# Patient Record
Sex: FEMALE | URBAN - METROPOLITAN AREA
[De-identification: names, ages, dates, MRNs, and addresses within clinical notes are randomized per-mention and may not be internally consistent; named-entity substitution may affect disease eponyms.]

---

## 2021-08-21 ENCOUNTER — OFFICE VISIT (OUTPATIENT)
Dept: URBAN - METROPOLITAN AREA CLINIC 56 | Facility: CLINIC | Age: 46
End: 2021-08-21
Payer: COMMERCIAL

## 2021-08-21 DIAGNOSIS — E11.9 DIABETES MELLITUS TYPE 2 WITHOUT MENTION OF COMPLICATION: Primary | ICD-10-CM

## 2021-08-21 DIAGNOSIS — H43.393 OTHER VITREOUS OPACITIES, BILATERAL: ICD-10-CM

## 2021-08-21 DIAGNOSIS — H02.822 CYSTS OF RIGHT LOWER EYELID: ICD-10-CM

## 2021-08-21 DIAGNOSIS — H02.825 CYSTS OF LEFT LOWER EYELID: ICD-10-CM

## 2021-08-21 DIAGNOSIS — H25.13 AGE-RELATED NUCLEAR CATARACT, BILATERAL: ICD-10-CM

## 2021-08-21 DIAGNOSIS — H02.821 CYSTS OF RIGHT UPPER EYELID: ICD-10-CM

## 2021-08-21 DIAGNOSIS — H02.824 CYSTS OF LEFT UPPER EYELID: ICD-10-CM

## 2021-08-21 DIAGNOSIS — H53.19 OTHER SUBJECTIVE VISUAL DISTURBANCES: ICD-10-CM

## 2021-08-21 DIAGNOSIS — Z79.84 LONG TERM USE OF ORAL ANTIDIABETIC DRUGS: ICD-10-CM

## 2021-08-21 PROCEDURE — 92250 FUNDUS PHOTOGRAPHY W/I&R: CPT | Performed by: OPTOMETRIST

## 2021-08-21 PROCEDURE — 92134 CPTRZ OPH DX IMG PST SGM RTA: CPT | Performed by: OPTOMETRIST

## 2021-08-21 PROCEDURE — 92004 COMPRE OPH EXAM NEW PT 1/>: CPT | Performed by: OPTOMETRIST

## 2021-08-21 ASSESSMENT — KERATOMETRY
OD: 44.49
OS: 44.35

## 2021-08-21 ASSESSMENT — INTRAOCULAR PRESSURE
OS: 20
OD: 20

## 2021-08-21 ASSESSMENT — VISUAL ACUITY
OD: 20/20
OS: 20/20

## 2021-08-21 NOTE — IMPRESSION/PLAN
Impression: Cysts of left lower eyelid: H02.825. Plan: Asymptomatic at this time. Can see oculoplastics in future if becomes bothersome.

## 2021-08-21 NOTE — IMPRESSION/PLAN
Impression: Cysts of right upper eyelid: H02.821. Plan: Asymptomatic at this time. Can see oculoplastics in future if becomes bothersome.

## 2021-08-21 NOTE — IMPRESSION/PLAN
Impression: Cysts of left upper eyelid: H02.824. Plan: Asymptomatic at this time. Can see oculoplastics in future if becomes bothersome.

## 2021-08-21 NOTE — IMPRESSION/PLAN
Impression: Other subjective visual disturbances: H53.19. Plan: Patient education fluctuation in vision is secondary to high A1C. Use OTC glasses for now. Refraction when A1C is lowered to 7 or below.

## 2021-08-21 NOTE — IMPRESSION/PLAN
Impression: Other vitreous opacities, bilateral: H43.393. Plan: Stable and asymptomatic. S/S of RD discussed with patient.

## 2025-01-30 ENCOUNTER — APPOINTMENT (OUTPATIENT)
Dept: URBAN - METROPOLITAN AREA CLINIC 174 | Facility: CLINIC | Age: 50
Setting detail: DERMATOLOGY
End: 2025-01-30

## 2025-01-30 DIAGNOSIS — D22 MELANOCYTIC NEVI: ICD-10-CM

## 2025-01-30 DIAGNOSIS — D485 NEOPLASM OF UNCERTAIN BEHAVIOR OF SKIN: ICD-10-CM

## 2025-01-30 PROBLEM — D22.4 MELANOCYTIC NEVI OF SCALP AND NECK: Status: ACTIVE | Noted: 2025-01-30

## 2025-01-30 PROBLEM — D48.5 NEOPLASM OF UNCERTAIN BEHAVIOR OF SKIN: Status: ACTIVE | Noted: 2025-01-30

## 2025-01-30 PROCEDURE — ? COUNSELING

## 2025-01-30 PROCEDURE — ? BIOPSY BY SHAVE METHOD

## 2025-01-30 PROCEDURE — 99202 OFFICE O/P NEW SF 15 MIN: CPT | Mod: 25

## 2025-01-30 PROCEDURE — 11102 TANGNTL BX SKIN SINGLE LES: CPT

## 2025-01-30 ASSESSMENT — LOCATION SIMPLE DESCRIPTION DERM
LOCATION SIMPLE: RIGHT EYELID
LOCATION SIMPLE: RIGHT ANTERIOR NECK

## 2025-01-30 ASSESSMENT — LOCATION DETAILED DESCRIPTION DERM
LOCATION DETAILED: RIGHT LATERAL CANTHUS
LOCATION DETAILED: RIGHT INFERIOR ANTERIOR NECK

## 2025-01-30 ASSESSMENT — LOCATION ZONE DERM
LOCATION ZONE: EYELID
LOCATION ZONE: NECK

## 2025-01-30 NOTE — PROCEDURE: BIOPSY BY SHAVE METHOD
Body Location Override (Optional - Billing Will Still Be Based On Selected Body Map Location If Applicable): right lateral canthus
Detail Level: Detailed
Depth Of Biopsy: dermis
Was A Bandage Applied: Yes
Size Of Lesion In Cm: 0
Biopsy Type: H and E
Biopsy Method: Personna blade
Anesthesia Type: 2% lidocaine with epinephrine
Anesthesia Volume In Cc: 0.5
Hemostasis: Drysol
Wound Care: Petrolatum
Dressing: bandage
Destruction After The Procedure: No
Type Of Destruction Used: Curettage
Curettage Text: The wound bed was treated with curettage after the biopsy was performed.
Cryotherapy Text: The wound bed was treated with cryotherapy after the biopsy was performed.
Electrodesiccation Text: The wound bed was treated with electrodesiccation after the biopsy was performed.
Electrodesiccation And Curettage Text: The wound bed was treated with electrodesiccation and curettage after the biopsy was performed.
Silver Nitrate Text: The wound bed was treated with silver nitrate after the biopsy was performed.
Lab: [id number]
Medical Necessity Information: It is in your best interest to select a reason for this procedure from the list below. All of these items fulfill various CMS LCD requirements except the new and changing color options.
Consent: Verbal consent was obtained and risks were reviewed including but not limited to scarring, infection, bleeding, scabbing, incomplete removal, nerve damage and allergy to anesthesia.
Post-Care Instructions: I reviewed with the patient in detail post-care instructions. Patient is to keep the biopsy site dry overnight, and then apply bacitracin twice daily until healed. Patient may apply hydrogen peroxide soaks to remove any crusting.
Notification Instructions: Patient will be notified of biopsy results. However, patient instructed to call the office if not contacted within 2 weeks.
Billing Type: Third-Party Bill
Information: Selecting Yes will display possible errors in your note based on the variables you have selected. This validation is only offered as a suggestion for you. PLEASE NOTE THAT THE VALIDATION TEXT WILL BE REMOVED WHEN YOU FINALIZE YOUR NOTE. IF YOU WANT TO FAX A PRELIMINARY NOTE YOU WILL NEED TO TOGGLE THIS TO 'NO' IF YOU DO NOT WANT IT IN YOUR FAXED NOTE.

## 2025-07-03 NOTE — IMPRESSION/PLAN
Error   Impression: Cysts of right lower eyelid: H02.822. Plan: Asymptomatic at this time. Can see oculoplastics in future if becomes bothersome.